# Patient Record
Sex: FEMALE | Race: BLACK OR AFRICAN AMERICAN | NOT HISPANIC OR LATINO | URBAN - METROPOLITAN AREA
[De-identification: names, ages, dates, MRNs, and addresses within clinical notes are randomized per-mention and may not be internally consistent; named-entity substitution may affect disease eponyms.]

---

## 2023-04-03 ENCOUNTER — OFFICE (OUTPATIENT)
Dept: URBAN - METROPOLITAN AREA CLINIC 72 | Facility: CLINIC | Age: 53
End: 2023-04-03

## 2023-04-03 VITALS
SYSTOLIC BLOOD PRESSURE: 130 MMHG | DIASTOLIC BLOOD PRESSURE: 98 MMHG | OXYGEN SATURATION: 97 % | HEART RATE: 84 BPM | HEIGHT: 65 IN | WEIGHT: 260 LBS

## 2023-04-03 DIAGNOSIS — R12 HEARTBURN: ICD-10-CM

## 2023-04-03 DIAGNOSIS — E11.69 TYPE 2 DIABETES MELLITUS WITH OTHER SPECIFIED COMPLICATION: ICD-10-CM

## 2023-04-03 PROCEDURE — 99203 OFFICE O/P NEW LOW 30 MIN: CPT | Performed by: INTERNAL MEDICINE

## 2023-04-03 RX ORDER — FAMOTIDINE 40 MG/1
TABLET, FILM COATED ORAL
Qty: 60 | Refills: 0 | Status: ACTIVE

## 2023-04-03 NOTE — SERVICENOTES
Our goal is to partner with you to improve your health and well being. It is important for you to complete necessary testing and follow the instructions given to you at your clinic visit. Our office will call you within 2 weeks with results of any testing but you may also call sooner to obtain results - [phone].   If you have any questions or concerns please feel free to call us.  We take your care very seriously and we thank you for your trust!
- make sure you don't have anything by mouth for 4 hours before you are due to arrive for your procedure
- schedule in May to give you some time to get settled on the new diabetes medications
- take pepcid up to twice a day as needed for reflux symtpoms on the ozempic
- , Follow up as needed if symptoms are not improving or you have new or concerning symptoms.

## 2023-04-03 NOTE — SERVICEHPINOTES
Moises Martin   is seen for an initial visit today.  
br
br   53 yeaer old referred for colohoscopy , she was scheduled for an OV because of new DM . 
br SHe had a colonoscopy years ago where she he had really bad acid reflux and she think she had both at the time. 
br
br She was started on meds for acid reflux but then it went away.  Since she has been on ozempic she has had some return of reflux symptoms.  She has also had increased bloating and diarrheabr
br She was recently diagnosed with diabetes and was put on jardiance and ozempic.  She is not checking her sugars regularly but when she checks it is running around 180.  My nurse has reviewed and updated the medication list with the patient. I have reviewed the medication list along with the documented medical, social and family history. Pertinent details are also noted above in the HPI.